# Patient Record
Sex: FEMALE | Race: BLACK OR AFRICAN AMERICAN | Employment: FULL TIME | URBAN - METROPOLITAN AREA
[De-identification: names, ages, dates, MRNs, and addresses within clinical notes are randomized per-mention and may not be internally consistent; named-entity substitution may affect disease eponyms.]

---

## 2018-07-31 ENCOUNTER — HOSPITAL ENCOUNTER (OUTPATIENT)
Dept: NUTRITION | Age: 48
Discharge: HOME | End: 2018-07-31
Payer: COMMERCIAL

## 2018-07-31 PROCEDURE — 97802 MEDICAL NUTRITION INDIV IN: CPT

## 2018-07-31 NOTE — PROGRESS NOTES
[address] [place] [address], [place], [place] [address]  Phone: [phone]  Fax: [phone]   Nutrition Assessment - Medical Nutrition Therapy   Outpatient Initial Evaluation         Patient Name: Bjorn Smith : 1970   Treatment Diagnosis: Obesity, recent stroke, HTN, HLD   Referral Source: Sandra Horn, * Start of Care [facility] [place]): 2018     Gender: female Age: 50 y.o. Ht: 63 in Wt:  275 lb  kg   BMI: 48.7 BMR   Male  BMR Female 1846     Past Medical History includes: Stroke (2018), HLD, HTN, GERD, arthritis  Allergies: Latex, oranges, pineapple, tomatoes     Pertinent Medications:   Started 18: statin, plavix, norvasc, iron supp, omeprazole, topamax     Biochemical Data:     Lab Results   Component Value Date/Time    Sodium 141 2016 04:45 AM    Potassium 4.2 2016 04:45 AM    Chloride 109 (H) 2016 04:45 AM    CO2 24 2016 04:45 AM    Anion gap 8 2016 04:45 AM    Glucose 130 (H) 2016 04:45 AM    BUN 7 2016 04:45 AM    Creatinine 0.49 (L) 2016 04:45 AM    BUN/Creatinine ratio 14 2016 04:45 AM    GFR est AA >60 2016 04:45 AM    GFR est non-AA >60 2016 04:45 AM    Calcium 8.9 2016 04:45 AM    Bilirubin, total 0.2 2016 10:25 AM    AST (SGOT) 14 (L) 2016 10:25 AM    Alk. phosphatase 67 2016 10:25 AM    Protein, total 6.7 2016 10:25 AM    Albumin 3.5 2016 10:25 AM    Globulin 3.2 2016 10:25 AM    A-G Ratio 1.1 2016 10:25 AM    ALT (SGPT) 18 2016 10:25 AM       Lab Results   Component Value Date/Time    ALT (SGPT) 18 2016 10:25 AM    AST (SGOT) 14 (L) 2016 10:25 AM    Alk.  phosphatase 67 2016 10:25 AM    Bilirubin, total 0.2 2016 10:25 AM     Lab Results   Component Value Date/Time    Creatinine 0.49 (L) 2016 04:45 AM     Lab Results   Component Value Date/Time    BUN 7 2016 04:45 AM Subjective/Assessment:   49 yo pt referred for help with wt management and diet education for cardiovascular health. Pt reports that she has been having headaches and feeling off for quite some time, but her bloodwork always came back normal. It wasn't until she had an MRI that evidence of a stroke and a blood clot in her brain were found. She was started on several medications to help clear the blood clot and improve her health. Pt has started trying to make dietary changes, but has minimal sound nutrition knowledge and is feeling overwhelmed with information/making changes. Current Eating Patterns: 6:30a: wake up  9a: breakfast sandwich, coffee with cream & no sugar  1-2p: orders out ([employer] pick 2 or tropical smoothie)  6pm: has been cooking a meat and veg   Pt reports she is starving by the time she gets home around 5pm, which has been making it harder to cook healthy meals. She has not been snacking, and trying to add 2T mustard per day to help with leg cramps since she can tolerate vinegar d/t reflux. She doesn't drink much of anything throughout the day, but reports having 2 glasses of wine per night. Estimate Needs   Calories: 1700  Protein: 105 Carbs: 190 Fat: 57   Kcal/day  g/day  g/day  g/day        percent: 25  45  30               Education & Recommendations provided: Educated pt on the pathophysiology of Type II Diabetes, insulin resistance and the rationale for dietary modifications and increased activity. Educated pt on carbohydrate food sources, counting carbohydrates, label reading, meal timing, and appropriate serving sizes. Encouraged pt to avoid sugary beverages.    Handouts Provided: [x]  Carbohydrates  [x]  Protein  [x]  Non-starchy Vegatbles  []  Food Label  [x]  Meal and Snack Ideas  []  Food Journals []  Diabetes  [x]  Facts about Fats  [x]  Sodium  []  Gen Nutr Guidelines  []  SBGM Guidelines  []  Others:   Information Reviewed with: pt   Readiness to Change Stage: [] Pre-contemplative    []  Contemplative  [x]  Preparation               []  Action                  []  Maintenance   Potential Barriers to Learning: []  Decline in memory    []  Language barrier   []  Other:  []  Emotional                  []  Limited mobility  []  Lack of motivation     [] Vision, hearing or cognitive impairment   Expected Compliance: Good      Nutritional Goal - To promote lifestyle changes to result in:    [x]  Weight loss  []  Improved diabetic control  [x]  Decreased cholesterol levels  [x]  Decreased blood pressure  []  Weight maintenance []  Preventing any interactions associated with food allergies  []  Adequate weight gain toward goal weight  [x]  Other: Improved cardiovascular health     Patient Goals:   1. Avoid going longer than 5 hours without eating. If you know you will, use a snack to prevent feelings of extreme hunger. 2. Try to have something to eat within 2 hours of waking up. 3. Pair carbohydrates with protein whenever possible. 4. To reduce sodium in your diet, choose foods that say 5% or less on the Nutrition Facts label. Avoid foods that have 20% or more. 5. Try to have 1 full glass of water before work and 1/2 of 32oz container at work at least 3 days per week.       Dietitian Signature: Trenton Reyes MS, RD Date: 7/31/2018   Follow-up: FriGriselda 8/31/18 at 3pm Time: 5:24 PM

## 2018-08-31 ENCOUNTER — APPOINTMENT (OUTPATIENT)
Dept: NUTRITION | Age: 48
End: 2018-08-31

## 2019-02-11 RX ORDER — SODIUM CHLORIDE 0.9 % (FLUSH) 0.9 %
5-40 SYRINGE (ML) INJECTION EVERY 8 HOURS
Status: CANCELLED | OUTPATIENT
Start: 2019-02-11

## 2019-02-11 RX ORDER — LEVOFLOXACIN 5 MG/ML
500 INJECTION, SOLUTION INTRAVENOUS ONCE
Status: CANCELLED | OUTPATIENT
Start: 2019-02-19 | End: 2019-02-19

## 2019-02-11 RX ORDER — SODIUM CHLORIDE, SODIUM LACTATE, POTASSIUM CHLORIDE, CALCIUM CHLORIDE 600; 310; 30; 20 MG/100ML; MG/100ML; MG/100ML; MG/100ML
150 INJECTION, SOLUTION INTRAVENOUS CONTINUOUS
Status: CANCELLED | OUTPATIENT
Start: 2019-02-11

## 2019-02-11 RX ORDER — SODIUM CHLORIDE 0.9 % (FLUSH) 0.9 %
5-40 SYRINGE (ML) INJECTION AS NEEDED
Status: CANCELLED | OUTPATIENT
Start: 2019-02-11

## 2019-02-13 ENCOUNTER — HOSPITAL ENCOUNTER (OUTPATIENT)
Dept: LAB | Age: 49
Discharge: HOME | End: 2019-02-13
Payer: COMMERCIAL

## 2019-02-13 ENCOUNTER — HOSPITAL ENCOUNTER (OUTPATIENT)
Dept: PREADMISSION TESTING | Age: 49
Discharge: HOME | End: 2019-02-13
Payer: COMMERCIAL

## 2019-02-13 LAB — SENTARA SPECIMEN COL,SENBCF: NORMAL

## 2019-02-13 PROCEDURE — 99001 SPECIMEN HANDLING PT-LAB: CPT

## 2019-02-13 PROCEDURE — 86900 BLOOD TYPING SEROLOGIC ABO: CPT

## 2019-02-13 RX ORDER — AMLODIPINE BESYLATE 5 MG/1
5 TABLET ORAL DAILY
COMMUNITY
Start: 2018-07-12

## 2019-02-13 RX ORDER — TOPIRAMATE 25 MG/1
25 TABLET ORAL DAILY
COMMUNITY
Start: 2018-07-13 | End: 2022-08-02 | Stop reason: ALTCHOICE

## 2019-02-13 RX ORDER — ATORVASTATIN CALCIUM 80 MG/1
80 TABLET, FILM COATED ORAL DAILY
COMMUNITY
Start: 2018-07-12

## 2019-02-13 RX ORDER — CLOPIDOGREL BISULFATE 75 MG/1
75 TABLET ORAL DAILY
COMMUNITY
Start: 2018-07-12

## 2019-02-13 RX ORDER — FLUTICASONE PROPIONATE 50 MCG
2 SPRAY, SUSPENSION (ML) NASAL DAILY
COMMUNITY
End: 2022-08-02 | Stop reason: ALTCHOICE

## 2019-02-13 RX ORDER — BISMUTH SUBSALICYLATE 262 MG
1 TABLET,CHEWABLE ORAL DAILY
COMMUNITY

## 2019-02-13 RX ORDER — EPINEPHRINE 0.3 MG/.3ML
0.3 INJECTION SUBCUTANEOUS AS NEEDED
COMMUNITY
End: 2022-08-02 | Stop reason: ALTCHOICE

## 2019-02-13 RX ORDER — AMLODIPINE BESYLATE 2.5 MG/1
2.5 TABLET ORAL DAILY
COMMUNITY
End: 2019-02-13 | Stop reason: DRUGHIGH

## 2019-02-13 RX ORDER — LEVOCETIRIZINE DIHYDROCHLORIDE 5 MG/1
5 TABLET, FILM COATED ORAL DAILY
COMMUNITY

## 2019-02-13 NOTE — PERIOP NOTES
PAT - SURGICAL PRE-ADMISSION INSTRUCTIONS    NAME:  Sanjay Vuong                                                          TODAY'S DATE:  2/13/2019    SURGERY DATE:  2/19/2019                                  SURGERY ARRIVAL TIME:   0800    1. Do NOT eat or drink anything, including candy or gum, after MIDNIGHT on 02/18 , unless you have specific instructions from your Surgeon or Anesthesia Provider to do so. 2. No smoking on the day of surgery. 3. No alcohol 24 hours prior to the day of surgery. 4. No recreational drugs for one week prior to the day of surgery. 5. Leave all valuables, including money/purse, at home. 6. Remove all jewelry, nail polish, makeup (including mascara); no lotions, powders, deodorant, or perfume/cologne/after shave. 7. Glasses/Contact lenses and Dentures may be worn to the hospital.  They will be removed prior to surgery. 8. Call your doctor if symptoms of a cold or illness develop within 24 ours prior to surgery. 9. AN ADULT MUST DRIVE YOU HOME AFTER OUTPATIENT SURGERY. 10. If you are having an OUTPATIENT procedure, please make arrangements for a responsible adult to be with you for 24 hours after your surgery. 11. If you are admitted to the hospital, you will be assigned to a bed after surgery is complete. Normally a family member will not be able to see you until you are in your assigned bed. 15. Family is encouraged to accompany you to the hospital.  We ask visitors in the treatment area to be limited to ONE person at a time to ensure patient privacy. EXCEPTIONS WILL BE MADE AS NEEDED. 15. Children under 12 are discouraged from entering the treatment area and need to be supervised by an adult when in the waiting room. Special Instructions:     Take these medications the morning of surgery with a sip of water:  Norvasc and Nasal spray, STOP anticoagulants AT LEAST 1 WEEK PRIOR to your surgery or, follow other MD instructions:  Stopped Plavix and Lipitor 2/10    Patient Prep:    use CHG solution    These surgical instructions were reviewed with Conor Li during the PAT visit. A printed copy of the instructions was provided to Conor Li. Directions: On the morning of surgery, please go to the [facility]. Enter the building from the [facility] entrance, 1st floor (next to the Emergency Room entrance). Take the elevator to the 2nd floor. Sign in at the Registration Desk.     If you have any questions and/or concerns, please do not hesitate to call:  (Prior to the day of surgery)  [facility] unit:  [phone]  (Day of surgery)  [facility] unit:  [phone]

## 2019-02-13 NOTE — H&P
[facility] HISTORY AND PHYSICAL Name:  Tori Lynn 
MR#:   [id number] :  1970 ACCOUNT #:  [de-identified] ADMIT DATE:  2019 DATE OF ADMISSION:  Going to be 2019. Plan is for robotic-assisted total laparoscopic hysterectomy, removal of uterus, 
cervix, and tubes, but probably not ovaries. DIAGNOSIS:  Intracavitary 8-cm fibroid, 12-week size uterus, heavy periods. HISTORY OF PRESENT ILLNESS:  This is a 54-year-old female  3, para 0, who came 
in with very heavy bleeding, an obvious intracavitary fibroid is noted. Future 
thoughts of childbearing are not entertained and with the heavy bleeding hemoglobin 9.5, she is on iron. We are attempting to give Lupron in case we needed to delay 
things, but we are proceeding. She also understands there is small chance where we 
may need to use morcellation which in case is something is cancerous this will be a 
disadvantage, but overall minimally invasive surgery and smaller incision is also of 
great benefit. Otherwise, the standard risks of surgery such as damage to bowel, bladder, other 
visceral structures, hematologic, infectious, and anesthetic complications were made 
cleared and agreed. PAST MEDICAL HISTORY: 
1. Last laparoscopy tubal block. 2.  [place] teeth. 3.  Foot surgery. 4.  Tonsils. 5. Hypertension,  
6. Mini strokes seen on an MRI so placed on blood thinners and antihypertensives. Negative for  heart disease, diabetes, medical, or other surgical 
problems. CURRENT MEDICATIONS:  Amlodipine, clopidogrel, and topiramate. SOCIAL HISTORY:  Denies smoking or heavy alcohol use. FAMILY HISTORY:  Mother and sister had strokes, otherwise negative. REVIEW OF SYSTEMS:  Normal other than the above. PHYSICAL EXAMINATION: 
VITAL SIGNS:  Weight 269, blood pressure 130/70. HEENT:  Normal. 
NECK:  Thyroid normal. 
CHEST:  Clear to auscultation and percussion. ABDOMEN:  Benign other than overweight. PELVIC:  Uterus feels about 12 weeks' in size. Pap is up to date. PLAN:  Robotic assisted total laparoscopic hysterectomy, possible morcellation. Wyatt Lima MD 
 
 
[id number] 
D:  02/13/2019 11:07 
T:  02/13/2019 16:32 
JOB #:  [id number] CC:  Maricamren Malin MD

## 2019-02-18 ENCOUNTER — ANESTHESIA EVENT (OUTPATIENT)
Dept: SURGERY | Age: 49
End: 2019-02-18
Payer: COMMERCIAL

## 2019-02-19 ENCOUNTER — ANESTHESIA (OUTPATIENT)
Dept: SURGERY | Age: 49
End: 2019-02-19
Payer: COMMERCIAL

## 2019-02-19 ENCOUNTER — HOSPITAL ENCOUNTER (OUTPATIENT)
Age: 49
Setting detail: OBSERVATION
Discharge: HOME | End: 2019-02-20
Attending: OBSTETRICS & GYNECOLOGY | Admitting: OBSTETRICS & GYNECOLOGY
Payer: COMMERCIAL

## 2019-02-19 PROBLEM — D25.9 FIBROID, UTERINE: Status: ACTIVE | Noted: 2019-02-19

## 2019-02-19 PROBLEM — N92.0 MENORRHAGIA WITH REGULAR CYCLE: Status: ACTIVE | Noted: 2019-02-19

## 2019-02-19 PROBLEM — D25.0 FIBROIDS, SUBMUCOSAL: Status: ACTIVE | Noted: 2019-02-19

## 2019-02-19 LAB
ABO + RH BLD: NORMAL
ABO + RH BLD: NORMAL
BASOPHILS # BLD: 0 K/UL (ref 0–0.1)
BASOPHILS NFR BLD: 0 % (ref 0–2)
BLOOD BANK CMNT PATIENT-IMP: NORMAL
BLOOD GROUP ANTIBODIES SERPL: NORMAL
BLOOD GROUP ANTIBODIES SERPL: NORMAL
DIFFERENTIAL METHOD BLD: ABNORMAL
EOSINOPHIL # BLD: 0.1 K/UL (ref 0–0.4)
EOSINOPHIL NFR BLD: 2 % (ref 0–5)
ERYTHROCYTE [DISTWIDTH] IN BLOOD BY AUTOMATED COUNT: 19.6 % (ref 11.6–14.5)
HCG UR QL: NEGATIVE
HCT VFR BLD AUTO: 35.7 % (ref 35–45)
HGB BLD-MCNC: 10.8 G/DL (ref 12–16)
LYMPHOCYTES # BLD: 1.5 K/UL (ref 0.9–3.6)
LYMPHOCYTES NFR BLD: 23 % (ref 21–52)
MCH RBC QN AUTO: 21.1 PG (ref 24–34)
MCHC RBC AUTO-ENTMCNC: 30.3 G/DL (ref 31–37)
MCV RBC AUTO: 69.9 FL (ref 74–97)
MONOCYTES # BLD: 0.3 K/UL (ref 0.05–1.2)
MONOCYTES NFR BLD: 5 % (ref 3–10)
NEUTS SEG # BLD: 4.7 K/UL (ref 1.8–8)
NEUTS SEG NFR BLD: 70 % (ref 40–73)
PLATELET # BLD AUTO: 296 K/UL (ref 135–420)
PMV BLD AUTO: 10.6 FL (ref 9.2–11.8)
RBC # BLD AUTO: 5.11 M/UL (ref 4.2–5.3)
SPECIMEN EXP DATE BLD: NORMAL
SPECIMEN EXP DATE BLD: NORMAL
WBC # BLD AUTO: 6.7 K/UL (ref 4.6–13.2)

## 2019-02-19 PROCEDURE — 99218 HC RM OBSERVATION: CPT

## 2019-02-19 PROCEDURE — 77030002986 HC SUT PROL J&J -A: Performed by: OBSTETRICS & GYNECOLOGY

## 2019-02-19 PROCEDURE — 76010000877 HC OR TIME 2.5 TO 3HR INTENSV - TIER 2: Performed by: OBSTETRICS & GYNECOLOGY

## 2019-02-19 PROCEDURE — 74011250637 HC RX REV CODE- 250/637: Performed by: NURSE ANESTHETIST, CERTIFIED REGISTERED

## 2019-02-19 PROCEDURE — 77030010507 HC ADH SKN DERMBND J&J -B: Performed by: OBSTETRICS & GYNECOLOGY

## 2019-02-19 PROCEDURE — 76210000006 HC OR PH I REC 0.5 TO 1 HR: Performed by: OBSTETRICS & GYNECOLOGY

## 2019-02-19 PROCEDURE — 77030019908 HC STETH ESOPH SIMS -A: Performed by: NURSE ANESTHETIST, CERTIFIED REGISTERED

## 2019-02-19 PROCEDURE — 74011250636 HC RX REV CODE- 250/636: Performed by: OBSTETRICS & GYNECOLOGY

## 2019-02-19 PROCEDURE — 77030027138 HC INCENT SPIROMETER -A

## 2019-02-19 PROCEDURE — 88305 TISSUE EXAM BY PATHOLOGIST: CPT

## 2019-02-19 PROCEDURE — 74011250637 HC RX REV CODE- 250/637: Performed by: OBSTETRICS & GYNECOLOGY

## 2019-02-19 PROCEDURE — 74011000250 HC RX REV CODE- 250

## 2019-02-19 PROCEDURE — 74011250636 HC RX REV CODE- 250/636

## 2019-02-19 PROCEDURE — 77030026243 HC MANIP UTER VCAR LSIS -B: Performed by: OBSTETRICS & GYNECOLOGY

## 2019-02-19 PROCEDURE — 77030020268 HC MISC GENERAL SUPPLY: Performed by: OBSTETRICS & GYNECOLOGY

## 2019-02-19 PROCEDURE — 77030013079 HC BLNKT BAIR HGGR 3M -A: Performed by: NURSE ANESTHETIST, CERTIFIED REGISTERED

## 2019-02-19 PROCEDURE — 77030008606 HC TRCR ENDOSC KII AMR -B: Performed by: OBSTETRICS & GYNECOLOGY

## 2019-02-19 PROCEDURE — 77030022704 HC SUT VLOC COVD -B: Performed by: OBSTETRICS & GYNECOLOGY

## 2019-02-19 PROCEDURE — 77030008683 HC TU ET CUF COVD -A: Performed by: NURSE ANESTHETIST, CERTIFIED REGISTERED

## 2019-02-19 PROCEDURE — 74011250636 HC RX REV CODE- 250/636: Performed by: NURSE ANESTHETIST, CERTIFIED REGISTERED

## 2019-02-19 PROCEDURE — 77030035277 HC OBTRTR BLDELSS DISP INTU -B: Performed by: OBSTETRICS & GYNECOLOGY

## 2019-02-19 PROCEDURE — 74011000272 HC RX REV CODE- 272: Performed by: OBSTETRICS & GYNECOLOGY

## 2019-02-19 PROCEDURE — 77030008518 HC TBNG INSUF ENDO STRY -B: Performed by: OBSTETRICS & GYNECOLOGY

## 2019-02-19 PROCEDURE — 77030018778 HC MANIP UTER VCAR CNMD -B: Performed by: OBSTETRICS & GYNECOLOGY

## 2019-02-19 PROCEDURE — 85025 COMPLETE CBC W/AUTO DIFF WBC: CPT

## 2019-02-19 PROCEDURE — 77030020703 HC SEAL CANN DISP INTU -B: Performed by: OBSTETRICS & GYNECOLOGY

## 2019-02-19 PROCEDURE — 81025 URINE PREGNANCY TEST: CPT

## 2019-02-19 PROCEDURE — 77030002933 HC SUT MCRYL J&J -A: Performed by: OBSTETRICS & GYNECOLOGY

## 2019-02-19 PROCEDURE — 77030021352 HC CBL LD SYS DISP COVD -B

## 2019-02-19 PROCEDURE — 77030018842 HC SOL IRR SOD CL 9% BAXT -A: Performed by: OBSTETRICS & GYNECOLOGY

## 2019-02-19 PROCEDURE — 77030032490 HC SLV COMPR SCD KNE COVD -B: Performed by: OBSTETRICS & GYNECOLOGY

## 2019-02-19 PROCEDURE — 77030034850: Performed by: OBSTETRICS & GYNECOLOGY

## 2019-02-19 PROCEDURE — 77030035236 HC SUT PDS STRATFX BARB J&J -B: Performed by: OBSTETRICS & GYNECOLOGY

## 2019-02-19 PROCEDURE — 88307 TISSUE EXAM BY PATHOLOGIST: CPT

## 2019-02-19 PROCEDURE — 77030035048 HC TRCR ENDOSC OPTCL COVD -B: Performed by: OBSTETRICS & GYNECOLOGY

## 2019-02-19 PROCEDURE — 76060000036 HC ANESTHESIA 2.5 TO 3 HR: Performed by: OBSTETRICS & GYNECOLOGY

## 2019-02-19 PROCEDURE — 86900 BLOOD TYPING SEROLOGIC ABO: CPT

## 2019-02-19 PROCEDURE — 77030008477 HC STYL SATN SLP COVD -A: Performed by: NURSE ANESTHETIST, CERTIFIED REGISTERED

## 2019-02-19 PROCEDURE — 77030003543 HC NDL EPDRL BBMI -A: Performed by: OBSTETRICS & GYNECOLOGY

## 2019-02-19 PROCEDURE — C1782 MORCELLATOR: HCPCS | Performed by: OBSTETRICS & GYNECOLOGY

## 2019-02-19 PROCEDURE — 77030016151 HC PROTCTR LNS DFOG COVD -B: Performed by: OBSTETRICS & GYNECOLOGY

## 2019-02-19 PROCEDURE — 77030008771 HC TU NG SALEM SUMP -A: Performed by: NURSE ANESTHETIST, CERTIFIED REGISTERED

## 2019-02-19 PROCEDURE — 74011000250 HC RX REV CODE- 250: Performed by: OBSTETRICS & GYNECOLOGY

## 2019-02-19 RX ORDER — SODIUM CHLORIDE 0.9 % (FLUSH) 0.9 %
5-40 SYRINGE (ML) INJECTION EVERY 8 HOURS
Status: DISCONTINUED | OUTPATIENT
Start: 2019-02-19 | End: 2019-02-19 | Stop reason: HOSPADM

## 2019-02-19 RX ORDER — GLYCOPYRROLATE 0.2 MG/ML
INJECTION INTRAMUSCULAR; INTRAVENOUS AS NEEDED
Status: DISCONTINUED | OUTPATIENT
Start: 2019-02-19 | End: 2019-02-19 | Stop reason: HOSPADM

## 2019-02-19 RX ORDER — DOCUSATE SODIUM 100 MG/1
100 CAPSULE, LIQUID FILLED ORAL
Status: DISCONTINUED | OUTPATIENT
Start: 2019-02-19 | End: 2019-02-20 | Stop reason: HOSPADM

## 2019-02-19 RX ORDER — OXYCODONE AND ACETAMINOPHEN 5; 325 MG/1; MG/1
1 TABLET ORAL AS NEEDED
Status: DISCONTINUED | OUTPATIENT
Start: 2019-02-19 | End: 2019-02-19 | Stop reason: HOSPADM

## 2019-02-19 RX ORDER — ESMOLOL HYDROCHLORIDE 10 MG/ML
INJECTION INTRAVENOUS AS NEEDED
Status: DISCONTINUED | OUTPATIENT
Start: 2019-02-19 | End: 2019-02-19 | Stop reason: HOSPADM

## 2019-02-19 RX ORDER — OMEPRAZOLE 20 MG/1
20 CAPSULE, DELAYED RELEASE ORAL DAILY
COMMUNITY

## 2019-02-19 RX ORDER — SODIUM CHLORIDE, SODIUM LACTATE, POTASSIUM CHLORIDE, CALCIUM CHLORIDE 600; 310; 30; 20 MG/100ML; MG/100ML; MG/100ML; MG/100ML
50 INJECTION, SOLUTION INTRAVENOUS CONTINUOUS
Status: DISPENSED | OUTPATIENT
Start: 2019-02-19 | End: 2019-02-20

## 2019-02-19 RX ORDER — DIPHENHYDRAMINE HYDROCHLORIDE 50 MG/ML
12.5 INJECTION, SOLUTION INTRAMUSCULAR; INTRAVENOUS
Status: DISCONTINUED | OUTPATIENT
Start: 2019-02-19 | End: 2019-02-19 | Stop reason: HOSPADM

## 2019-02-19 RX ORDER — MORPHINE SULFATE 1 MG/ML
2 INJECTION, SOLUTION EPIDURAL; INTRATHECAL; INTRAVENOUS
Status: DISCONTINUED | OUTPATIENT
Start: 2019-02-19 | End: 2019-02-20 | Stop reason: HOSPADM

## 2019-02-19 RX ORDER — DEXAMETHASONE SODIUM PHOSPHATE 4 MG/ML
INJECTION, SOLUTION INTRA-ARTICULAR; INTRALESIONAL; INTRAMUSCULAR; INTRAVENOUS; SOFT TISSUE AS NEEDED
Status: DISCONTINUED | OUTPATIENT
Start: 2019-02-19 | End: 2019-02-19 | Stop reason: HOSPADM

## 2019-02-19 RX ORDER — BUPIVACAINE HYDROCHLORIDE AND EPINEPHRINE 5; 5 MG/ML; UG/ML
INJECTION, SOLUTION EPIDURAL; INTRACAUDAL; PERINEURAL AS NEEDED
Status: DISCONTINUED | OUTPATIENT
Start: 2019-02-19 | End: 2019-02-19 | Stop reason: HOSPADM

## 2019-02-19 RX ORDER — VECURONIUM BROMIDE FOR INJECTION 1 MG/ML
INJECTION, POWDER, LYOPHILIZED, FOR SOLUTION INTRAVENOUS AS NEEDED
Status: DISCONTINUED | OUTPATIENT
Start: 2019-02-19 | End: 2019-02-19 | Stop reason: HOSPADM

## 2019-02-19 RX ORDER — FENTANYL CITRATE 50 UG/ML
INJECTION, SOLUTION INTRAMUSCULAR; INTRAVENOUS AS NEEDED
Status: DISCONTINUED | OUTPATIENT
Start: 2019-02-19 | End: 2019-02-19 | Stop reason: HOSPADM

## 2019-02-19 RX ORDER — SODIUM CHLORIDE 0.9 % (FLUSH) 0.9 %
5-40 SYRINGE (ML) INJECTION AS NEEDED
Status: DISCONTINUED | OUTPATIENT
Start: 2019-02-19 | End: 2019-02-20 | Stop reason: HOSPADM

## 2019-02-19 RX ORDER — INSULIN LISPRO 100 [IU]/ML
INJECTION, SOLUTION INTRAVENOUS; SUBCUTANEOUS ONCE
Status: DISCONTINUED | OUTPATIENT
Start: 2019-02-19 | End: 2019-02-19 | Stop reason: HOSPADM

## 2019-02-19 RX ORDER — INSULIN LISPRO 100 [IU]/ML
INJECTION, SOLUTION INTRAVENOUS; SUBCUTANEOUS ONCE
Status: DISCONTINUED | OUTPATIENT
Start: 2019-02-19 | End: 2019-02-19 | Stop reason: SDUPTHER

## 2019-02-19 RX ORDER — ONDANSETRON 2 MG/ML
4 INJECTION INTRAMUSCULAR; INTRAVENOUS
Status: DISCONTINUED | OUTPATIENT
Start: 2019-02-19 | End: 2019-02-20 | Stop reason: HOSPADM

## 2019-02-19 RX ORDER — ENOXAPARIN SODIUM 100 MG/ML
40 INJECTION SUBCUTANEOUS EVERY 24 HOURS
Status: DISCONTINUED | OUTPATIENT
Start: 2019-02-19 | End: 2019-02-19

## 2019-02-19 RX ORDER — ATORVASTATIN CALCIUM 40 MG/1
80 TABLET, FILM COATED ORAL
Status: DISCONTINUED | OUTPATIENT
Start: 2019-02-19 | End: 2019-02-20 | Stop reason: HOSPADM

## 2019-02-19 RX ORDER — HYDROCODONE BITARTRATE AND ACETAMINOPHEN 5; 325 MG/1; MG/1
1 TABLET ORAL
Status: DISCONTINUED | OUTPATIENT
Start: 2019-02-19 | End: 2019-02-20

## 2019-02-19 RX ORDER — HYDROMORPHONE HYDROCHLORIDE 2 MG/ML
0.5 INJECTION, SOLUTION INTRAMUSCULAR; INTRAVENOUS; SUBCUTANEOUS
Status: DISCONTINUED | OUTPATIENT
Start: 2019-02-19 | End: 2019-02-19 | Stop reason: HOSPADM

## 2019-02-19 RX ORDER — AMLODIPINE BESYLATE 5 MG/1
5 TABLET ORAL DAILY
Status: DISCONTINUED | OUTPATIENT
Start: 2019-02-19 | End: 2019-02-20 | Stop reason: HOSPADM

## 2019-02-19 RX ORDER — SODIUM CHLORIDE 0.9 % (FLUSH) 0.9 %
5-40 SYRINGE (ML) INJECTION AS NEEDED
Status: DISCONTINUED | OUTPATIENT
Start: 2019-02-19 | End: 2019-02-19 | Stop reason: HOSPADM

## 2019-02-19 RX ORDER — ENOXAPARIN SODIUM 100 MG/ML
40 INJECTION SUBCUTANEOUS
Status: COMPLETED | OUTPATIENT
Start: 2019-02-19 | End: 2019-02-19

## 2019-02-19 RX ORDER — MAGNESIUM SULFATE 100 %
4 CRYSTALS MISCELLANEOUS AS NEEDED
Status: DISCONTINUED | OUTPATIENT
Start: 2019-02-19 | End: 2019-02-19 | Stop reason: HOSPADM

## 2019-02-19 RX ORDER — LIDOCAINE HYDROCHLORIDE 20 MG/ML
INJECTION, SOLUTION EPIDURAL; INFILTRATION; INTRACAUDAL; PERINEURAL AS NEEDED
Status: DISCONTINUED | OUTPATIENT
Start: 2019-02-19 | End: 2019-02-19 | Stop reason: HOSPADM

## 2019-02-19 RX ORDER — HYDROMORPHONE HYDROCHLORIDE 1 MG/ML
INJECTION, SOLUTION INTRAMUSCULAR; INTRAVENOUS; SUBCUTANEOUS AS NEEDED
Status: DISCONTINUED | OUTPATIENT
Start: 2019-02-19 | End: 2019-02-19 | Stop reason: HOSPADM

## 2019-02-19 RX ORDER — ZOLPIDEM TARTRATE 5 MG/1
5 TABLET ORAL
Status: DISCONTINUED | OUTPATIENT
Start: 2019-02-19 | End: 2019-02-20 | Stop reason: HOSPADM

## 2019-02-19 RX ORDER — SODIUM CHLORIDE, SODIUM LACTATE, POTASSIUM CHLORIDE, CALCIUM CHLORIDE 600; 310; 30; 20 MG/100ML; MG/100ML; MG/100ML; MG/100ML
100 INJECTION, SOLUTION INTRAVENOUS CONTINUOUS
Status: DISCONTINUED | OUTPATIENT
Start: 2019-02-19 | End: 2019-02-19 | Stop reason: HOSPADM

## 2019-02-19 RX ORDER — NEOSTIGMINE METHYLSULFATE 5 MG/5 ML
SYRINGE (ML) INTRAVENOUS AS NEEDED
Status: DISCONTINUED | OUTPATIENT
Start: 2019-02-19 | End: 2019-02-19 | Stop reason: HOSPADM

## 2019-02-19 RX ORDER — SODIUM CHLORIDE, SODIUM LACTATE, POTASSIUM CHLORIDE, CALCIUM CHLORIDE 600; 310; 30; 20 MG/100ML; MG/100ML; MG/100ML; MG/100ML
INJECTION, SOLUTION INTRAVENOUS
Status: DISCONTINUED | OUTPATIENT
Start: 2019-02-19 | End: 2019-02-19 | Stop reason: HOSPADM

## 2019-02-19 RX ORDER — SODIUM CHLORIDE 0.9 % (FLUSH) 0.9 %
5-40 SYRINGE (ML) INJECTION EVERY 8 HOURS
Status: DISCONTINUED | OUTPATIENT
Start: 2019-02-19 | End: 2019-02-20 | Stop reason: HOSPADM

## 2019-02-19 RX ORDER — LEVOFLOXACIN 5 MG/ML
500 INJECTION, SOLUTION INTRAVENOUS ONCE
Status: COMPLETED | OUTPATIENT
Start: 2019-02-19 | End: 2019-02-19

## 2019-02-19 RX ORDER — PANTOPRAZOLE SODIUM 20 MG/1
20 TABLET, DELAYED RELEASE ORAL 2 TIMES DAILY WITH MEALS
Status: DISCONTINUED | OUTPATIENT
Start: 2019-02-19 | End: 2019-02-20 | Stop reason: HOSPADM

## 2019-02-19 RX ORDER — DEXTROSE MONOHYDRATE 25 G/50ML
25-50 INJECTION, SOLUTION INTRAVENOUS AS NEEDED
Status: DISCONTINUED | OUTPATIENT
Start: 2019-02-19 | End: 2019-02-19 | Stop reason: HOSPADM

## 2019-02-19 RX ORDER — ONDANSETRON 2 MG/ML
INJECTION INTRAMUSCULAR; INTRAVENOUS AS NEEDED
Status: DISCONTINUED | OUTPATIENT
Start: 2019-02-19 | End: 2019-02-19 | Stop reason: HOSPADM

## 2019-02-19 RX ORDER — TOPIRAMATE 25 MG/1
25 TABLET ORAL
Status: DISCONTINUED | OUTPATIENT
Start: 2019-02-19 | End: 2019-02-20 | Stop reason: HOSPADM

## 2019-02-19 RX ORDER — PROPOFOL 10 MG/ML
INJECTION, EMULSION INTRAVENOUS AS NEEDED
Status: DISCONTINUED | OUTPATIENT
Start: 2019-02-19 | End: 2019-02-19 | Stop reason: HOSPADM

## 2019-02-19 RX ORDER — HYDROMORPHONE HYDROCHLORIDE 2 MG/ML
0.2 INJECTION, SOLUTION INTRAMUSCULAR; INTRAVENOUS; SUBCUTANEOUS AS NEEDED
Status: DISCONTINUED | OUTPATIENT
Start: 2019-02-19 | End: 2019-02-19 | Stop reason: HOSPADM

## 2019-02-19 RX ORDER — DIPHENHYDRAMINE HYDROCHLORIDE 50 MG/ML
25 INJECTION, SOLUTION INTRAMUSCULAR; INTRAVENOUS
Status: DISCONTINUED | OUTPATIENT
Start: 2019-02-19 | End: 2019-02-20 | Stop reason: HOSPADM

## 2019-02-19 RX ORDER — EPHEDRINE SULFATE 50 MG/ML
INJECTION, SOLUTION INTRAVENOUS AS NEEDED
Status: DISCONTINUED | OUTPATIENT
Start: 2019-02-19 | End: 2019-02-19 | Stop reason: HOSPADM

## 2019-02-19 RX ORDER — SODIUM CHLORIDE, SODIUM LACTATE, POTASSIUM CHLORIDE, CALCIUM CHLORIDE 600; 310; 30; 20 MG/100ML; MG/100ML; MG/100ML; MG/100ML
150 INJECTION, SOLUTION INTRAVENOUS CONTINUOUS
Status: DISCONTINUED | OUTPATIENT
Start: 2019-02-19 | End: 2019-02-20 | Stop reason: HOSPADM

## 2019-02-19 RX ORDER — FAMOTIDINE 20 MG/1
20 TABLET, FILM COATED ORAL ONCE
Status: COMPLETED | OUTPATIENT
Start: 2019-02-19 | End: 2019-02-19

## 2019-02-19 RX ORDER — SUCCINYLCHOLINE CHLORIDE 20 MG/ML
INJECTION INTRAMUSCULAR; INTRAVENOUS AS NEEDED
Status: DISCONTINUED | OUTPATIENT
Start: 2019-02-19 | End: 2019-02-19 | Stop reason: HOSPADM

## 2019-02-19 RX ORDER — MIDAZOLAM HYDROCHLORIDE 1 MG/ML
INJECTION, SOLUTION INTRAMUSCULAR; INTRAVENOUS AS NEEDED
Status: DISCONTINUED | OUTPATIENT
Start: 2019-02-19 | End: 2019-02-19 | Stop reason: HOSPADM

## 2019-02-19 RX ORDER — ACETAMINOPHEN 325 MG/1
650 TABLET ORAL
Status: DISCONTINUED | OUTPATIENT
Start: 2019-02-19 | End: 2019-02-20

## 2019-02-19 RX ORDER — CLINDAMYCIN PHOSPHATE 600 MG/50ML
600 INJECTION INTRAVENOUS ONCE
Status: DISCONTINUED | OUTPATIENT
Start: 2019-02-19 | End: 2019-02-19 | Stop reason: DRUGHIGH

## 2019-02-19 RX ORDER — LORATADINE 10 MG/1
10 TABLET ORAL
Status: DISCONTINUED | OUTPATIENT
Start: 2019-02-19 | End: 2019-02-20 | Stop reason: HOSPADM

## 2019-02-19 RX ORDER — CLINDAMYCIN PHOSPHATE 900 MG/50ML
900 INJECTION INTRAVENOUS ONCE
Status: COMPLETED | OUTPATIENT
Start: 2019-02-19 | End: 2019-02-19

## 2019-02-19 RX ADMIN — MIDAZOLAM HYDROCHLORIDE 2 MG: 1 INJECTION, SOLUTION INTRAMUSCULAR; INTRAVENOUS at 13:40

## 2019-02-19 RX ADMIN — TOPIRAMATE 25 MG: 25 TABLET, FILM COATED ORAL at 19:23

## 2019-02-19 RX ADMIN — LIDOCAINE HYDROCHLORIDE 100 MG: 20 INJECTION, SOLUTION EPIDURAL; INFILTRATION; INTRACAUDAL; PERINEURAL at 13:48

## 2019-02-19 RX ADMIN — FENTANYL CITRATE 50 MCG: 50 INJECTION, SOLUTION INTRAMUSCULAR; INTRAVENOUS at 14:37

## 2019-02-19 RX ADMIN — FENTANYL CITRATE 100 MCG: 50 INJECTION, SOLUTION INTRAMUSCULAR; INTRAVENOUS at 13:45

## 2019-02-19 RX ADMIN — ENOXAPARIN SODIUM 40 MG: 40 INJECTION SUBCUTANEOUS at 14:19

## 2019-02-19 RX ADMIN — DEXAMETHASONE SODIUM PHOSPHATE 4 MG: 4 INJECTION, SOLUTION INTRA-ARTICULAR; INTRALESIONAL; INTRAMUSCULAR; INTRAVENOUS; SOFT TISSUE at 13:58

## 2019-02-19 RX ADMIN — AMLODIPINE BESYLATE 5 MG: 5 TABLET ORAL at 19:23

## 2019-02-19 RX ADMIN — Medication 2 MG: at 21:25

## 2019-02-19 RX ADMIN — HYDROMORPHONE HYDROCHLORIDE 0.5 MG: 2 INJECTION INTRAMUSCULAR; INTRAVENOUS; SUBCUTANEOUS at 16:50

## 2019-02-19 RX ADMIN — OXYCODONE AND ACETAMINOPHEN 1 TABLET: 5; 325 TABLET ORAL at 17:24

## 2019-02-19 RX ADMIN — VECURONIUM BROMIDE FOR INJECTION 2 MG: 1 INJECTION, POWDER, LYOPHILIZED, FOR SOLUTION INTRAVENOUS at 14:58

## 2019-02-19 RX ADMIN — ESMOLOL HYDROCHLORIDE 5 MG: 10 INJECTION INTRAVENOUS at 14:52

## 2019-02-19 RX ADMIN — SODIUM CHLORIDE, SODIUM LACTATE, POTASSIUM CHLORIDE, CALCIUM CHLORIDE: 600; 310; 30; 20 INJECTION, SOLUTION INTRAVENOUS at 13:58

## 2019-02-19 RX ADMIN — LEVOFLOXACIN 500 MG: 5 INJECTION, SOLUTION INTRAVENOUS at 09:32

## 2019-02-19 RX ADMIN — ONDANSETRON 4 MG: 2 INJECTION INTRAMUSCULAR; INTRAVENOUS at 16:00

## 2019-02-19 RX ADMIN — EPHEDRINE SULFATE 5 MG: 50 INJECTION, SOLUTION INTRAVENOUS at 14:32

## 2019-02-19 RX ADMIN — Medication 3 MG: at 16:06

## 2019-02-19 RX ADMIN — ATORVASTATIN CALCIUM 80 MG: 40 TABLET, FILM COATED ORAL at 19:23

## 2019-02-19 RX ADMIN — EPHEDRINE SULFATE 5 MG: 50 INJECTION, SOLUTION INTRAVENOUS at 14:29

## 2019-02-19 RX ADMIN — GLYCOPYRROLATE 0.2 MG: 0.2 INJECTION INTRAMUSCULAR; INTRAVENOUS at 14:29

## 2019-02-19 RX ADMIN — SODIUM CHLORIDE, SODIUM LACTATE, POTASSIUM CHLORIDE, AND CALCIUM CHLORIDE 150 ML/HR: 600; 310; 30; 20 INJECTION, SOLUTION INTRAVENOUS at 18:54

## 2019-02-19 RX ADMIN — PROPOFOL 160 MG: 10 INJECTION, EMULSION INTRAVENOUS at 13:49

## 2019-02-19 RX ADMIN — FAMOTIDINE 20 MG: 20 TABLET ORAL at 09:25

## 2019-02-19 RX ADMIN — DIPHENHYDRAMINE HYDROCHLORIDE 12.5 MG: 50 INJECTION, SOLUTION INTRAMUSCULAR; INTRAVENOUS at 17:15

## 2019-02-19 RX ADMIN — CLINDAMYCIN PHOSPHATE 900 MG: 900 INJECTION INTRAVENOUS at 14:07

## 2019-02-19 RX ADMIN — SODIUM CHLORIDE, SODIUM LACTATE, POTASSIUM CHLORIDE, AND CALCIUM CHLORIDE 150 ML/HR: 600; 310; 30; 20 INJECTION, SOLUTION INTRAVENOUS at 08:51

## 2019-02-19 RX ADMIN — HYDROMORPHONE HYDROCHLORIDE 1 MG: 1 INJECTION, SOLUTION INTRAMUSCULAR; INTRAVENOUS; SUBCUTANEOUS at 16:08

## 2019-02-19 RX ADMIN — HYDROMORPHONE HYDROCHLORIDE 1 MG: 1 INJECTION, SOLUTION INTRAMUSCULAR; INTRAVENOUS; SUBCUTANEOUS at 14:04

## 2019-02-19 RX ADMIN — ESMOLOL HYDROCHLORIDE 5 MG: 10 INJECTION INTRAVENOUS at 14:54

## 2019-02-19 RX ADMIN — VECURONIUM BROMIDE FOR INJECTION 7 MG: 1 INJECTION, POWDER, LYOPHILIZED, FOR SOLUTION INTRAVENOUS at 13:59

## 2019-02-19 RX ADMIN — VECURONIUM BROMIDE FOR INJECTION 1 MG: 1 INJECTION, POWDER, LYOPHILIZED, FOR SOLUTION INTRAVENOUS at 13:45

## 2019-02-19 RX ADMIN — GLYCOPYRROLATE 0.2 MG: 0.2 INJECTION INTRAMUSCULAR; INTRAVENOUS at 16:06

## 2019-02-19 RX ADMIN — FENTANYL CITRATE 50 MCG: 50 INJECTION, SOLUTION INTRAMUSCULAR; INTRAVENOUS at 14:44

## 2019-02-19 RX ADMIN — SUCCINYLCHOLINE CHLORIDE 200 MG: 20 INJECTION INTRAMUSCULAR; INTRAVENOUS at 13:50

## 2019-02-19 NOTE — ANESTHESIA PREPROCEDURE EVALUATION
Anesthetic History   No history of anesthetic complications            Review of Systems / Medical History  Patient summary reviewed and pertinent labs reviewed    Pulmonary  Within defined limits                 Neuro/Psych       CVA: no residual symptoms  TIA    Comments:  Many CVA/ TIA (slurred speech) on plavix Cardiovascular  Within defined limits                Exercise tolerance: >4 METS     GI/Hepatic/Renal  Within defined limits              Endo/Other        Morbid obesity     Other Findings              Physical Exam    Airway  Mallampati: II  TM Distance: 4 - 6 cm  Neck ROM: normal range of motion   Mouth opening: Normal     Cardiovascular  Regular rate and rhythm,  S1 and S2 normal,  no murmur, click, rub, or gallop  Rhythm: regular  Rate: normal         Dental    Dentition: Lower dentition intact     Pulmonary  Breath sounds clear to auscultation               Abdominal  GI exam deferred       Other Findings            Anesthetic Plan    ASA: 3  Anesthesia type: general          Induction: Intravenous  Anesthetic plan and risks discussed with: Patient

## 2019-02-19 NOTE — ROUTINE PROCESS
TRANSFER - IN REPORT:    Verbal report received from josselin(name) on Carlos Seth  being received from [facility](unit) for routine post - op      Report consisted of patients Situation, Background, Assessment and   Recommendations(SBAR). Information from the following report(s) SBAR was reviewed with the receiving nurse. Opportunity for questions and clarification was provided.

## 2019-02-19 NOTE — PERIOP NOTES
Pt unable to urinate. IV started early to encourage. Pt states she has her medicines in her bag and will be taking them. Advised that she does not take them without ok from medical staff.      2705 Walking pt to [facility] for urine sample    0914 Urine to POC testing    Salena Trejo, friend, ride home. Can share condition after surgery but no other medical information. [phone]    Pt request no visitors. Gricel Ramon RN by bedside.   Will pass on in report

## 2019-02-19 NOTE — OP NOTES
Theodora Goodell, MD  [address]   [address] [address]  [facility]  [phone]    Operative Note for Robotic Assisted Hysterectomy    Name: Yunior Clements Record Number: [id number]   YOB: 1970  Today's Date: February 19, 2019    Preop Diagnosis: n92.0 menorrhagia with regular cycles  d25.9 fibroids  Postop Diagnosis: n92.0 menorrhagia with regular cycles  d25.9 fibroids    Procedure(s):  davinci total laparoscopic HYSTERECTOMY XI ROBOTIC ASSISTED    Surgeon: Brock German MD  Surgeon(s):  MD Armando Dickson MD  Assistant: Circ-1: Paula Rae RN  Circ-Relief: Andrea Jori  Scrub Tech-1: [place] Faden  Scrub Tech-Relief: Cathlene Moat D  Surg Asst-1: Edmonia Media  Surg Asst-2: Rhae Nahid  Surg Asst-Relief: Bibi CARVALHO   Anesthesia: General  EBL:50  Findings: 450 gm   size uterus  Specimens:   ID Type Source Tests Collected by Time Destination   1 : right and left fallopain tubes and uterus 450g Preservative   Armando Knutson MD 2/19/2019 1509 Pathology      Complications: none  Disposition: to the recovery room in stable condition  Implants: none   Procedure: The patient was brought to the operating room where patient identification and surgery identification were completed with the patient and the OR team. The patient was transferred to the OR table and GETA was obtained without difficulty. Both arms were padded and tucked. SCD's were on and activated. The patient was placed in the lithotomy position in adjustable Niranjan stirrups. The was carefully tested in trendelenburg before being prepped and draped in the normal sterile fashion. Patient identification and surgery identification were repeated with the surgeon and the OR team.         A weighted speculum was placed vaginally and the anterior lip of the cervix was grasped with a single toothed tenaculum. The uterus was sounded.   Stay sutures of 0-Vicryl were placed. V care of medium size was placed and normal saline was used to inflate the intrauterine balloon . All other instruments were removed. A estrada catheter was placed with clear urine noted. Attention was turned to the abdomen. An 11 blade scaple was then used to make an 8 mm incision  above the umbilicus. A Veress needle was introduced into the abdominal cavity with anterior tenting of the abdominal wall. Pneumoperitoneum was obtained using CO2 gas to a pressure of 15 mm of Hg. The Veress needle was removed and a 8 mm trocar port was placed without complication. The patient was placed in steep [place] and 8 mm incisions were made in the right and left lower quadrants, after . 25% Marcaine with epinephrine was injected using an epidural  needle. Two 8 mm robotic trocar ports were placed under laparoscopic visualization without complication. A separate assistant port was placed in the Right Lower Quadrant (RLQ) under direct vision. A 8 mm non bladed trocar was then placed in the right  lower quadrant again after . 25% Marcaine with epinephrine was injected using a spinal needle. The Rhomania robotic system was then brought up to the patient and docked without difficulty. The surgeon then went to the surgeons console. The pelvis was examined with the above noted findings. The left cornual region of the uterus was grasped and the fallopian tubes were removed by using the bipolar sealing device. The utero-ovarian, round and broad ligaments were cauterized using the bipolar cautery and transected using the monopolar scissors. The bladder flap was created across the lower uterine segment. The uterine vessel bundle was skeletonized, cauterized with the bipolar cautery and cut with the monopolar scissors. Attention was turned to the right cornual region where the same transections were performed.  The V care cup was then elevated and the uterus retroverted by the 's assistant. .     A  total hysterectomy was accomplished by the following method: An anterior colpotomy was the preformed using the monopolar scissors and extended laterally. The uterus was then anteverted and a posterior colpotomy preformed with the monopolar scissors and extended laterally and circumferential until the completion of the colpotomy was obtained and the balloon bulb was inserted into the vagina  to maintain pneomoperitoneum. The suture was introduced thorough one of the operative ports. The edges of the vaginal cuff were examined and vaginal cuff closure was then completed using V-loc 90 suture . The morcellator was introduced and the bag placed through it. The uterus was placed in the bag and was morcellated in it. The bag was removed through the assistant port site when empty. The pelvis was copiously irrigated with Gentamycin irrigant and all fluid was removed. Hemostasis was again noted. The ureters were identified bilaterally with normal peristalsis noted. The robot was the then undocked from the trocar and moved away from the patient. The lower quadrant ports were removed and hemostasis was noted. The ports, as they were  8 mm and smaller were closed on the skin. The incisions were closed with subcuticular 3-0 Monocryl suture. The estrada catheter was removed. at the close of the procedure. Sponge, lap, needle and instrument counts were correct x 2. A post procedure pause was done to review the procedure, the findings, the specimen, blood loss, I+O as well as any concerns from the team.  The patient was transferred to the  extubated, in stable condition.     Chadd Jasmine MD  February 19, 2019

## 2019-02-19 NOTE — PERIOP NOTES
1630 Pt received to PACU and connected to monitor. Bedside report given by Dolly Gurrola RN. Vital signs stable. Nurse at bedside. Will continue to monitor. [address] to update pt's family on current status    9504 TRANSFER - OUT REPORT:    Verbal report given to Alna Carreon  on Sandre Sake  being transferred to Room 2222  for routine post - op       Report consisted of patients Situation, Background, Assessment and   Recommendations(SBAR). Information from the following report(s) SBAR, Kardex and MAR was reviewed with the receiving nurse. Lines:   Peripheral IV 02/19/19 Left;Posterior Hand (Active)   Site Assessment Clean, dry, & intact 2/19/2019  8:50 AM   Phlebitis Assessment 0 2/19/2019  8:50 AM   Infiltration Assessment 0 2/19/2019  8:50 AM   Dressing Status Clean, dry, & intact 2/19/2019  8:50 AM   Dressing Type Tape;Transparent 2/19/2019  8:50 AM   Hub Color/Line Status Pink; Infusing 2/19/2019  8:50 AM       Peripheral IV 02/19/19 Right Hand (Active)   Site Assessment Clean, dry, & intact 2/19/2019  4:30 PM   Phlebitis Assessment 0 2/19/2019  4:30 PM   Infiltration Assessment 0 2/19/2019  4:30 PM   Dressing Status Clean, dry, & intact 2/19/2019  4:30 PM   Dressing Type Transparent;Tape 2/19/2019  4:30 PM   Hub Color/Line Status Green; Infusing 2/19/2019  4:30 PM   Action Taken Open ports on tubing capped 2/19/2019  4:30 PM   Alcohol Cap Used Yes 2/19/2019  4:30 PM        Opportunity for questions and clarification was provided.       Patient transported with:   [employer]

## 2019-02-19 NOTE — PROGRESS NOTES
1800 - Pt in bed stating she NEEDS to take her meds this evening before bed with dinner. She states she take 5mg Norvasc, 80mg lipitor,  25mg topamax, 5mg xyzal and 20mg omeprazol. On call MD paged to have meds ordered for pt. Nutrition services notified of pt diet status. Dressings are CDI, pt is drowsy but arouseable then proceeds to go back to sleep, estrada draining, AOx4, will cont to monitor. Bedside and Verbal shift change report given to Adelita Boyd RN (oncoming nurse) by Phyllis Cloud RN (offgoing nurse). Report included the following information SBAR, Kardex, Intake/Output and MAR.

## 2019-02-20 VITALS
BODY MASS INDEX: 47.25 KG/M2 | HEART RATE: 72 BPM | DIASTOLIC BLOOD PRESSURE: 80 MMHG | HEIGHT: 63 IN | RESPIRATION RATE: 17 BRPM | OXYGEN SATURATION: 100 % | TEMPERATURE: 98.5 F | WEIGHT: 266.7 LBS | SYSTOLIC BLOOD PRESSURE: 135 MMHG

## 2019-02-20 LAB
ERYTHROCYTE [DISTWIDTH] IN BLOOD BY AUTOMATED COUNT: 19.2 % (ref 11.6–14.5)
HCT VFR BLD AUTO: 29.6 % (ref 35–45)
HGB BLD-MCNC: 9.1 G/DL (ref 12–16)
MCH RBC QN AUTO: 20.8 PG (ref 24–34)
MCHC RBC AUTO-ENTMCNC: 30.7 G/DL (ref 31–37)
MCV RBC AUTO: 67.7 FL (ref 74–97)
PLATELET # BLD AUTO: 237 K/UL (ref 135–420)
RBC # BLD AUTO: 4.37 M/UL (ref 4.2–5.3)
WBC # BLD AUTO: 13.2 K/UL (ref 4.6–13.2)

## 2019-02-20 PROCEDURE — 74011250637 HC RX REV CODE- 250/637: Performed by: OBSTETRICS & GYNECOLOGY

## 2019-02-20 PROCEDURE — 85027 COMPLETE CBC AUTOMATED: CPT

## 2019-02-20 PROCEDURE — 99218 HC RM OBSERVATION: CPT

## 2019-02-20 PROCEDURE — 74011250636 HC RX REV CODE- 250/636: Performed by: OBSTETRICS & GYNECOLOGY

## 2019-02-20 RX ORDER — HYDROCODONE BITARTRATE AND ACETAMINOPHEN 5; 325 MG/1; MG/1
1-2 TABLET ORAL
Status: DISCONTINUED | OUTPATIENT
Start: 2019-02-20 | End: 2019-02-20 | Stop reason: HOSPADM

## 2019-02-20 RX ADMIN — HYDROCODONE BITARTRATE AND ACETAMINOPHEN 2 TABLET: 5; 325 TABLET ORAL at 12:15

## 2019-02-20 RX ADMIN — HYDROCODONE BITARTRATE AND ACETAMINOPHEN 1 TABLET: 5; 325 TABLET ORAL at 00:10

## 2019-02-20 RX ADMIN — HYDROCODONE BITARTRATE AND ACETAMINOPHEN 1 TABLET: 5; 325 TABLET ORAL at 08:08

## 2019-02-20 RX ADMIN — Medication 2 MG: at 02:48

## 2019-02-20 RX ADMIN — AMLODIPINE BESYLATE 5 MG: 5 TABLET ORAL at 08:08

## 2019-02-20 NOTE — PROGRESS NOTES
conducted an initial consultation and Spiritual Assessment for Sean Diaz, who is a 50 y.o.,female. Patients Primary Language is: [place]. According to the patients EMR Jew Affiliation is: Non Religious.     The reason the Patient came to the hospital is:   Patient Active Problem List    Diagnosis Date Noted    Fibroids, submucosal 02/19/2019    Fibroid, uterine 02/19/2019    Menorrhagia with regular cycle 02/19/2019    Iron deficiency anemia 11/19/2016    Hay fever 11/19/2016    KATERIN (obstructive sleep apnea) 11/19/2016    Osteoarthritis (arthritis due to wear and tear of joints) 11/19/2016    Menorrhagia 11/19/2016    Lumbar stenosis 11/18/2016        The  provided the following Interventions:  Initiated a relationship of care and support with patient in room 2222 this morning on day one of her stay with us. .  Listened empathically as she shared her story and her hopes. Patient says that she is feeling ok at present. Provided information about Spiritual Care Services. Offered prayer and assurance of continued prayers on patients behalf. The following outcomes were achieved:  Patient shared limited information about her medical narrative. Patient processed feeling about current hospitalization. Patient expressed gratitude for pastoral care visit. Assessment:  Patient does not have any Evangelical/cultural needs that will affect patients preferences in health care. There are no further spiritual or Evangelical issues which require Spiritual Care Services interventions at this time. Plan:  Chaplains will continue to follow and will provide pastoral care on an as needed/requested basis    . Andrea Romero   Spiritual Care   [phone]

## 2019-02-20 NOTE — PROGRESS NOTES
1933 Received patient from [facility]. Patient is alert and oriented x 4.     2200 Patient requested to be Given medication for pain. 2235 Follow up on above good results noted. 2767 Patient ambulated to nurses station. 8177 Bedside and Verbal shift change report given to [facility] (oncoming nurse) by Divya Abbasi RN (offgoing nurse). Report included the following information SBAR, Intake/Output, MAR and Recent Results.

## 2019-02-20 NOTE — ROUTINE PROCESS
Patient sitting up in chair in the dark tolerating clear tray. Patient does not want the light on or door open. Patient refuses to have continuous pulse ox. States she does not use a cpap machine at night.   Primary RN made aware

## 2019-02-20 NOTE — DISCHARGE INSTRUCTIONS
[address], [address], [place], [address]  [phone]      PROCEDURE: Procedure(s):  davinci total laparoscopic HYSTERECTOMY XI ROBOTIC ASSISTED    Notify [address] if any of the following occur:     You are unable to urinate. Urgency to urinate is not uncommon.  Excessive vaginal bleeding > 1 maxi pad an hour for more then 2 hours straight.  Temperature above 101.0° and / or chills.  You are nauseous and / or vomiting and you cannot hold down any fluids.  Your pain is not controlled with the pain medication prescribed. Special Considerations:      Do not drive for at least 24 hours after the procedure and until you are no longer taking narcotic pain medication and you are able to move and react without hesitation.  You may return to work in 7 weeks or when cleared by physician. [x] Pelvic rest (nothing in the vagina) for 6 weeks. [x] No heavy lifting over 10 pounds & no strenuous exercise for 6 weeks. [] Other instructions:         MEDICATIONS: PROVIDED AT DISCHARGE OR PREVIOUSLY PRESCRIBED AT PRE OPERATIVE APPOINTMENT WITH Cliff Spencer--  Narcotic Pain Med.   []  Vicodin®   [x]  Norco - already given Rx   []  Dilaudid®    Non-narcotic Pain Med. [x]   Ibuprofen - no Rx needed        Antibiotics   []  Cipro®   []  Keflex®     []  Bactrim DS®       Urgency   []   Vesicare®       Nausea      []    Zofran®     []    Phenergan®       Other   [x]    Colace 100mg po bid prn constipation - no Rx needed           If you have not already scheduled your post operative appointment please do so soon. Your post operative appointment should be in 2 weeks. Please contact [address] at [phone] or go to the nearest Emergency Department / Urgent Care facility for any other medical questions or concerns.        Rodney Mcclain MD  February 20, 2019       DISCHARGE SUMMARY from Nurse    PATIENT INSTRUCTIONS:    After general anesthesia or intravenous sedation, for 24 hours or while taking prescription Narcotics:  · Limit your activities  · Do not drive and operate hazardous machinery  · Do not make important personal or business decisions  · Do  not drink alcoholic beverages  · If you have not urinated within 8 hours after discharge, please contact your surgeon on call. Report the following to your surgeon:  · Excessive pain, swelling, redness or odor of or around the surgical area  · Temperature over 100.5  · Nausea and vomiting lasting longer than 4 hours or if unable to take medications  · Any signs of decreased circulation or nerve impairment to extremity: change in color, persistent  numbness, tingling, coldness or increase pain  · Any questions    What to do at Home:  Recommended activity: See surgical instructions. *  Please give a list of your current medications to your Primary Care Provider. *  Please update this list whenever your medications are discontinued, doses are      changed, or new medications (including over-the-counter products) are added. *  Please carry medication information at all times in case of emergency situations. These are general instructions for a healthy lifestyle:    No smoking/ No tobacco products/ Avoid exposure to second hand smoke  Surgeon General's Warning:  Quitting smoking now greatly reduces serious risk to your health. Obesity, smoking, and sedentary lifestyle greatly increases your risk for illness    A healthy diet, regular physical exercise & weight monitoring are important for maintaining a healthy lifestyle    You may be retaining fluid if you have a history of heart failure or if you experience any of the following symptoms:  Weight gain of 3 pounds or more overnight or 5 pounds in a week, increased swelling in our hands or feet or shortness of breath while lying flat in bed.   Please call your doctor as soon as you notice any of these symptoms; do not wait until your next office visit. Recognize signs and symptoms of STROKE:    F-face looks uneven    A-arms unable to move or move unevenly    S-speech slurred or non-existent    T-time-call 911 as soon as signs and symptoms begin-DO NOT go       Back to bed or wait to see if you get better-TIME IS BRAIN. Warning Signs of HEART ATTACK     Call 911 if you have these symptoms:   Chest discomfort. Most heart attacks involve discomfort in the center of the chest that lasts more than a few minutes, or that goes away and comes back. It can feel like uncomfortable pressure, squeezing, fullness, or pain.  Discomfort in other areas of the upper body. Symptoms can include pain or discomfort in one or both arms, the back, neck, jaw, or stomach.  Shortness of breath with or without chest discomfort.  Other signs may include breaking out in a cold sweat, nausea, or lightheadedness. Don't wait more than five minutes to call 911 - MINUTES MATTER! Fast action can save your life. Calling 911 is almost always the fastest way to get lifesaving treatment. Emergency Medical Services staff can begin treatment when they arrive -- up to an hour sooner than if someone gets to the hospital by car. The discharge information has been reviewed with the patient. The patient verbalized understanding. Discharge medications reviewed with the patient and appropriate educational materials and side effects teaching were provided.   ___________________________________________________________________________________________________________________________________    Patient armband removed and shredded

## 2019-02-20 NOTE — ROUTINE PROCESS
Patient arrived from pacu on stretcher  Sleepy but arouses to voice. Encourage eye opening when transferring self from stretcher to bed. Patient will fall asleep quickly when not engaged in conversation.

## 2019-02-20 NOTE — DISCHARGE SUMMARY
Gynecology Surgical Discharge Summary     Name: Darius Hough MRN: [id number]  SSN: [id number]    YOB: 1970  Age: 50 y.o. Sex: female      Admit date: 2019    Discharge Date: 2019      Attending Physician: Brock German MD     Admission Diagnoses: fibroids, heavy menstrual bleeding    Discharge Diagnoses: Principal Problem:    Fibroids, submucosal (2019)    Active Problems:    Iron deficiency anemia (2016)      KATERIN (obstructive sleep apnea) (2016)      Menorrhagia (2016)      Fibroid, uterine (2019)      Menorrhagia with regular cycle (2019)         Procedures:davinci total laparoscopic [address] Course: 48yo  POD#1 s/p scheduled RA TLH, bilateral salpingectomy. Post operative, pt doing well. Ambulating, voiding, tolerating regular diet, pain well controlled with po pain meds. Denies fevers, chills, N/V/D, CP, SOB, vaginal bleeding. She plans to resume her Plavix POD#2 as discussed with Dr. Anastacio Fang.      Significant Diagnostic Studies:   Recent Results (from the past 24 hour(s))   CBC W/O DIFF    Collection Time: 19  2:20 AM   Result Value Ref Range    WBC 13.2 4.6 - 13.2 K/uL    RBC 4.37 4.20 - 5.30 M/uL    HGB 9.1 (L) 12.0 - 16.0 g/dL    HCT 29.6 (L) 35.0 - 45.0 %    MCV 67.7 (L) 74.0 - 97.0 FL    MCH 20.8 (L) 24.0 - 34.0 PG    MCHC 30.7 (L) 31.0 - 37.0 g/dL    RDW 19.2 (H) 11.6 - 14.5 %    PLATELET 512 952 - 722 K/uL       Vitals:    Patient Vitals for the past 8 hrs:   BP Temp Pulse Resp SpO2   19 1119 135/80 98.5 °F (36.9 °C) 72 17 100 %   19 0824 134/87 98 °F (36.7 °C) 76 17 100 %     Temp (24hrs), Av.9 °F (36.6 °C), Min:97.1 °F (36.2 °C), Max:98.5 °F (36.9 °C)    Physical Exam:   Gen:  A&O, NAD  CV:  RRR  Pulm:  CTAB  Abd;  Soft, nttp, non distended, +BS x4 quadrants, incisions c/d/i  Ext:  No clubbing/cyanosis/edema/redness, nttp    Lab/Data Review:      WBC   Date/Time Value Ref Range Status   02/20/2019 02:20 AM 13.2 4.6 - 13.2 K/uL Final   02/19/2019 08:44 AM 6.7 4.6 - 13.2 K/uL Final   11/19/2016 04:45 AM 12.5 4.6 - 13.2 K/uL Final     HGB   Date/Time Value Ref Range Status   02/20/2019 02:20 AM 9.1 (L) 12.0 - 16.0 g/dL Final   02/19/2019 08:44 AM 10.8 (L) 12.0 - 16.0 g/dL Final   11/19/2016 04:45 AM 8.9 (L) 12.0 - 16.0 g/dL Final     PLATELET   Date/Time Value Ref Range Status   02/20/2019 02:20  135 - 420 K/uL Final         Patient Instructions:   Current Discharge Medication List      CONTINUE these medications which have NOT CHANGED    Details   omeprazole (PRILOSEC) 20 mg capsule Take 20 mg by mouth daily. fluticasone (FLONASE ALLERGY RELIEF) 50 mcg/actuation nasal spray 2 Sprays by Both Nostrils route daily. levocetirizine (XYZAL) 5 mg tablet Take 5 mg by mouth daily. clopidogrel (PLAVIX) 75 mg tab Take 75 mg by mouth daily. atorvastatin (LIPITOR) 80 mg tablet Take 80 mg by mouth daily. topiramate (TOPAMAX) 25 mg tablet Take 25 mg by mouth daily. amLODIPine (NORVASC) 5 mg tablet Take 5 mg by mouth daily. ferrous sulfate 325 mg (65 mg iron) cpER Take 2 Tabs by mouth every fourty-eight (48) hours. multivitamin (ONE A DAY) tablet Take 1 Tab by mouth daily. glucosamine-D3-Boswellia serr (OSTEO BI-FLEX, 5-LOXIN,) 1,500-400-100 mg-unit-mg tab Take 2 Tabs by mouth every seven (7) days. EPINEPHrine (EPIPEN) 0.3 mg/0.3 mL injection 0.3 mg by IntraMUSCular route as needed. Activity: No sex, douching, or tampons for 6 weeks or as directed by your physician. No heavy lifting for 6 weeks. No driving while taking pain medication. Diet: Resume pre-hospital diet  Wound Care: Keep wound clean and dry    Follow-up Appointments   Procedures    FOLLOW UP VISIT Appointment in: Two Weeks Follow up with Dr. Marina Garcia at [address] [facility] for already scheduled post op visit.      Follow up with Dr. Marina Garcia at [address] [place] [address] for already scheduled post op visit. Standing Status:   Standing     Number of Occurrences:   1     Order Specific Question:   Appointment in     Answer:    Two Weeks        Signed By:  Sally Leigh MD     February 20, 2019

## 2019-02-20 NOTE — ANESTHESIA POSTPROCEDURE EVALUATION
Procedure(s):  davinci total laparoscopic HYSTERECTOMY XI ROBOTIC ASSISTED.     Anesthesia Post Evaluation      Multimodal analgesia: multimodal analgesia used between 6 hours prior to anesthesia start to PACU discharge  Patient location during evaluation: bedside  Patient participation: complete - patient participated  Level of consciousness: awake  Pain management: adequate  Airway patency: patent  Anesthetic complications: no  Cardiovascular status: stable  Respiratory status: acceptable  Hydration status: acceptable  Post anesthesia nausea and vomiting:  controlled      Visit Vitals  /84 (BP 1 Location: Right arm, BP Patient Position: At rest)   Pulse 81   Temp 36.2 °C (97.1 °F)   Resp 14   Ht 5' 3\" (1.6 m)   Wt 121 kg (266 lb 11.2 oz)   SpO2 100%   BMI 47.24 kg/m²

## 2019-02-20 NOTE — PROGRESS NOTES
Physical Exam     Bedside and Verbal shift change report given to Santiago Andres RN (oncoming nurse) by Butch Higginbotham RN (offgoing nurse). Report included the following information SBAR, Kardex, Intake/Output and MAR.     0883 - prn meds administered for pain rated 6/10. Will cont to monitor. 9966 - pain reassessment performed. Pt states pain is 6/10. Pt also states \"im a baby with pain\". Will page MD regarding pain meds    1030 - notified Dr. Bruce Vargas of pt c/o pain despite prn meds given throughout night & day. Notified that Dr. Juan Cruz would be in soon to round on patients. 1215 - prn meds administered for pain rated 6/10. Will cont to monitor. 1345 - I have reviewed discharge instructions with the patient. The patient verbalized understanding. Pain reassessment performed. Pt rated pain 3/10.     1425 - Bedside and Verbal shift change report given to Rosalba Bautista RN (oncoming nurse) by Santiago Andres RN (offgoing nurse). Report included the following information SBAR, Kardex, Intake/Output and MAR.

## 2019-02-20 NOTE — PROGRESS NOTES
Reason for Admission:   Justus hysterectomy                   RRAT Score:    3                Plan for utilizing home health:   no                       Likelihood of Readmission:  low                         Transition of Care Plan:    Spoke with pt, lives alone. Designates her sister for dcp. Josephine Lei  Will transport home. Independent with adls and amb. No dme. Employed. Plan home. Patient has designated __sister______________________ to participate in his/her discharge plan and to receive any needed information. Name: Flower Dang  Address:  Phone number: [phone]    Patient and/or next of kin has been given the Outpatient Observation Information and Notification letter and all questions answered. Care Management Interventions  PCP Verified by CM: Yes  Palliative Care Criteria Met (RRAT>21 & CHF Dx)?: No  Mode of Transport at Discharge:  Other (see comment)  Transition of Care Consult (CM Consult): Discharge Planning  Discharge Durable Medical Equipment: No  Physical Therapy Consult: No  Occupational Therapy Consult: No  Speech Therapy Consult: No  Current Support Network: Lives Alone  Confirm Follow Up Transport: Friends  Plan discussed with Pt/Family/Caregiver: Yes  Discharge Location  Discharge Placement: Home

## 2019-02-25 RX ORDER — ENOXAPARIN SODIUM 100 MG/ML
40 INJECTION SUBCUTANEOUS EVERY 24 HOURS
Status: CANCELLED | OUTPATIENT
Start: 2019-02-25 | End: 2019-02-28

## (undated) DEVICE — SOL INJ SOD CL 0.9% 1000ML BG --

## (undated) DEVICE — DEVON™ KNEE AND BODY STRAP 60" X 3" (1.5 M X 7.6 CM): Brand: DEVON

## (undated) DEVICE — Device

## (undated) DEVICE — PREP SKN CHLRAPRP 26ML TNT -- CONVERT TO ITEM 373320

## (undated) DEVICE — ARM DRAPE

## (undated) DEVICE — VCARE MEDIUM, UTERINE MANIPULATOR, VAGINAL-CERVICAL-AHLUWALIA'S-RETRACTOR-ELEVATOR: Brand: VCARE

## (undated) DEVICE — SUTURE V-LOC 180 SZ 0 L12IN ABSRB GRN L37MM GS-21 1/2 CIR VLOCL0316

## (undated) DEVICE — GOWN,AURORA,FABRIC-REINFORCED,X-LARGE: Brand: MEDLINE

## (undated) DEVICE — COVER LT HNDL BLU PLAS

## (undated) DEVICE — REM POLYHESIVE ADULT PATIENT RETURN ELECTRODE: Brand: VALLEYLAB

## (undated) DEVICE — SHEET,DRAPE,70X100,STERILE: Brand: MEDLINE

## (undated) DEVICE — SUTURE MCRYL SZ 4-0 L18IN ABSRB UD L19MM PS-2 3/8 CIR PRIM Y496G

## (undated) DEVICE — KENDALL SCD EXPRESS SLEEVES, KNEE LENGTH, MEDIUM: Brand: KENDALL SCD

## (undated) DEVICE — VCARE DX UTERINE MANIPULATOR/INJECTOR CANNULA: Brand: VCARE DX

## (undated) DEVICE — DISPOSABLE SUCTION/IRRIGATOR TUBE SET WITH TIP: Brand: AHTO

## (undated) DEVICE — LIGHT HANDLE: Brand: DEVON

## (undated) DEVICE — INTENDED FOR TISSUE SEPARATION, AND OTHER PROCEDURES THAT REQUIRE A SHARP SURGICAL BLADE TO PUNCTURE OR CUT.: Brand: BARD-PARKER ® CARBON RIB-BACK BLADES

## (undated) DEVICE — Z DISCONTINUED USE 2639304 STRAP POS W3INXL30FT WIDE BK TO BK HK AND LOOP CLSR ALISTRP

## (undated) DEVICE — [HIGH FLOW INSUFFLATOR,  DO NOT USE IF PACKAGE IS DAMAGED,  KEEP DRY,  KEEP AWAY FROM SUNLIGHT,  PROTECT FROM HEAT AND RADIOACTIVE SOURCES.]: Brand: PNEUMOSURE

## (undated) DEVICE — SYR 10ML CTRL LR LCK NSAF LF --

## (undated) DEVICE — STERILE POLYISOPRENE POWDER-FREE SURGICAL GLOVES: Brand: PROTEXIS

## (undated) DEVICE — BLADELESS OBTURATOR

## (undated) DEVICE — ELECTRO LUBE IS A SINGLE PATIENT USE DEVICE THAT IS INTENDED TO BE USED ON ELECTROSURGICAL ELECTRODES TO REDUCE STICKING.: Brand: KEY SURGICAL ELECTRO LUBE

## (undated) DEVICE — DERMABOND SKIN ADH 0.7ML -- DERMABOND ADVANCED 12/BX

## (undated) DEVICE — DRAPE,UTILITY,TAPE,15X26,STERILE: Brand: MEDLINE

## (undated) DEVICE — TROCAR: Brand: KII FIOS FIRST ENTRY

## (undated) DEVICE — MORCELLATOR ENDO 15MM OBT DISPOSABLEXCISE

## (undated) DEVICE — SUTURE ABSRB L30CM 2-0 VLT SPRL PDS + STRATAFIX SXPP1B410

## (undated) DEVICE — NEEDLE EPI L3.5IN OD20GA CLR POLYCARB HUB WNG N DEHP TUOHY

## (undated) DEVICE — COLUMN DRAPE

## (undated) DEVICE — TIP COVER ACCESSORY

## (undated) DEVICE — SEAL UNIV 5-8MM DISP BX/10 -- DA VINCI XI - SNGL USE

## (undated) DEVICE — TRAY CATH 16F URIN MTR LTX -- CONVERT TO ITEM 363111

## (undated) DEVICE — SOL IRR NACL 0.9% 500ML POUR --

## (undated) DEVICE — BLADELESS OPTICAL TROCAR WITH FIXATION CANNULA: Brand: VERSAPORT

## (undated) DEVICE — SUT PROL 0 30IN CT1 BLU --

## (undated) DEVICE — VISUALIZATION SYSTEM: Brand: CLEARIFY